# Patient Record
Sex: FEMALE | Race: WHITE | NOT HISPANIC OR LATINO | URBAN - NONMETROPOLITAN AREA
[De-identification: names, ages, dates, MRNs, and addresses within clinical notes are randomized per-mention and may not be internally consistent; named-entity substitution may affect disease eponyms.]

---

## 2018-11-26 PROBLEM — H52.4: Noted: 2018-11-26

## 2018-11-26 PROBLEM — H52.03: Noted: 2018-11-26

## 2018-11-26 PROBLEM — H52.223: Noted: 2018-11-26

## 2019-12-18 ENCOUNTER — IMPORTED ENCOUNTER (OUTPATIENT)
Dept: URBAN - NONMETROPOLITAN AREA CLINIC 1 | Facility: CLINIC | Age: 49
End: 2019-12-18

## 2019-12-18 PROCEDURE — S0621 ROUTINE OPHTHALMOLOGICAL EXA: HCPCS

## 2019-12-18 NOTE — PATIENT DISCUSSION
BF rx given.; Dr's Notes: Has cleaning service. Father Mr. Marina Lazo being tx for prostate CA. Had 13 yo dog Kibbles pass away. [place]

## 2020-09-28 NOTE — PATIENT DISCUSSION
Combigan drop instilled this afternoon since IOP still somewhat elevated. Patient did not take drops this morning.

## 2020-09-28 NOTE — PATIENT DISCUSSION
Lens fragments present in the anterior chamber. Most likely present in back of the eye as well. Patient advised that we could proceed with cataract surgery but would almost certainly run into complications because the capsule is not intact. May be able to place a lens into the sulcus but that is also unlikely. Patient would still need a second operation with Dr. Isi Granado to remove the lens fragments from the back of the eye. Second option is to do surgery with Dr. Isi Granado today to remove the fragments and be left aphakic. Dr. Isi Granado to do a sutured implant once OD stable from lens fragment removal. Dr. Yara Taylor and Dr. Isi Granado agree this is the best approach. Patient to have surgery with Dr. Isi Granado this afternoon.

## 2020-12-21 ENCOUNTER — IMPORTED ENCOUNTER (OUTPATIENT)
Dept: URBAN - NONMETROPOLITAN AREA CLINIC 1 | Facility: CLINIC | Age: 50
End: 2020-12-21

## 2020-12-21 PROCEDURE — S0621 ROUTINE OPHTHALMOLOGICAL EXA: HCPCS

## 2020-12-21 NOTE — PATIENT DISCUSSION
Presbyopia-Discussed diagnosis with patient. Hyperopia-Discussed diagnosis with patient. Astigmatism-Discussed diagnosis with patient. Updated spec Rx given. Recommend lens that will provide comfort as well as protect safety and health of eyes.; 's Notes: Has cleaning service. Father Mr. Soto Earing being tx for prostate CA. Had 13 yo dog Kibbles pass away. Nickie Tovar

## 2022-04-09 ASSESSMENT — TONOMETRY
OS_IOP_MMHG: 16
OD_IOP_MMHG: 14
OD_IOP_MMHG: 16
OS_IOP_MMHG: 14

## 2022-04-09 ASSESSMENT — VISUAL ACUITY
OS_SC: 20/25
OU_CC: 20/20
OU_SC: 20/20
OS_SC: 20/20-1
OD_SC: 20/20
OD_SC: 20/25